# Patient Record
Sex: FEMALE
[De-identification: names, ages, dates, MRNs, and addresses within clinical notes are randomized per-mention and may not be internally consistent; named-entity substitution may affect disease eponyms.]

---

## 2019-10-09 ENCOUNTER — APPOINTMENT (OUTPATIENT)
Dept: PAIN MANAGEMENT | Facility: CLINIC | Age: 64
End: 2019-10-09
Payer: MEDICARE

## 2019-10-09 VITALS
DIASTOLIC BLOOD PRESSURE: 70 MMHG | SYSTOLIC BLOOD PRESSURE: 122 MMHG | HEIGHT: 66 IN | BODY MASS INDEX: 27.64 KG/M2 | WEIGHT: 172 LBS

## 2019-10-09 DIAGNOSIS — M79.2 NEURALGIA AND NEURITIS, UNSPECIFIED: ICD-10-CM

## 2019-10-09 DIAGNOSIS — M79.18 MYALGIA, OTHER SITE: ICD-10-CM

## 2019-10-09 DIAGNOSIS — G89.4 CHRONIC PAIN SYNDROME: ICD-10-CM

## 2019-10-09 DIAGNOSIS — M54.12 RADICULOPATHY, CERVICAL REGION: ICD-10-CM

## 2019-10-09 DIAGNOSIS — M48.02 SPINAL STENOSIS, CERVICAL REGION: ICD-10-CM

## 2019-10-09 PROBLEM — Z00.00 ENCOUNTER FOR PREVENTIVE HEALTH EXAMINATION: Status: ACTIVE | Noted: 2019-10-09

## 2019-10-09 PROCEDURE — 99204 OFFICE O/P NEW MOD 45 MIN: CPT

## 2019-10-09 RX ORDER — BIMATOPROST 0.1 MG/ML
0.01 SOLUTION/ DROPS OPHTHALMIC
Refills: 0 | Status: ACTIVE | COMMUNITY

## 2019-10-09 RX ORDER — METHYLPREDNISOLONE 4 MG/1
4 TABLET ORAL
Qty: 1 | Refills: 0 | Status: ACTIVE | COMMUNITY
Start: 2019-10-09 | End: 1900-01-01

## 2019-10-09 NOTE — ASSESSMENT
[FreeTextEntry1] : I personally reviewed the relevant imaging.  Discussed and explained to patient the likely source of pathology and pain.  Questions answered.\par \par Significant cervical spinal stenosis with possible myelomalacia on imaging\par \par Will refer back to Dr. Pollock for surgical evaluation\par \par trial medrol dose pack for symptoms\par \par careful PT - minimize motion\par \par if she is unable to have surgery and continues to have pain despite conservative treatments, may consider PAYTON - patient understands increased risks associated with spinal cord signal changes\par \par \par The above diagnosis and treatment plan is medically reasonable and necessary based on the patient encounter.\par Opiod contract signed, and patient provided copy. I-stop checked - appropriate. \par There were no barriers to communication.\par Informed patient that I would be available for any additional questions.\par Patient was instructed to call with any worsening symptoms including severe pain, new numbness/weakness, or changes in the bowel/bladder function. \par  \par Discussed role of nsaids in pain management and all relevant risks, if patient is continuing to require after 4 weeks the patient should f/u for alternative treatment. \par \par Instructed patient to maintain pain diary to monitor pain level, mobility, and function.\par \par The referring provider was informed of the above diagnosis and treatment plan.\par

## 2019-10-09 NOTE — REASON FOR VISIT
[Initial Consultation] : an initial pain management consultation [FreeTextEntry2] : referred by Dr. GOOD  for evaluation of neck pain

## 2019-10-09 NOTE — HISTORY OF PRESENT ILLNESS
[___ yrs] : [unfilled] year(s) ago [Paroxysmal] : paroxysmal [Throbbing] : throbbing [Burning] : burning [Turning Head] : turning head [Heat] : heat [3] : 2. What number best describes how, during the past week, pain has interfered with your enjoyment of life? 3/10 pain [FreeTextEntry4] : PT [FreeTextEntry7] : left shoulder pain [FreeTextEntry1] : \par \par HPI\par \par Ms. PITER GONZALES is a 64 year F with pmhx bilateral knee knee replacement and UC.  presents with neck pain radiating to left shoulder with associated numbness in left hand.  Presents with neck pain since 2015 denies any worsening weakness, bowel/bladder dysfunction\par \par \par \par Previous and current pain medications/doses/effects:\par \par gabapentin without improvement\par \par Previous Pain Treatments:\par \par PT with mild improvement\par \par Previous Pain Injections:\par \par n/a\par \par Previous Diagnostic Studies/Images:\par \par MRI CS 9/2019\par \par C3-4  Together with mild left-sided facet arthropathy and hypertrophy causes mild left-sided foraminal narrowing.\par \par C4-5 there is mild diffuse disc bulge causing mild bilateral frontal narrowing.\par \par C5-6 there is moderate endplate degenerative changes. There is mild to moderate diffuse disc bulge with underlying spurring the causes mild compression of the spinal cord and moderate bilateral foraminal narrowing, right side greater than left. There is mildly increased T2 signal with ventral cord which may represent myomalacia versus edema. There is no associated spinal cord expansion. There is mild increase from previous exam.\par \par C6-7 there is mild diffuse disc bulge which causes mild compression of the ventral aspect of the thecal sac and mild left foraminal narrowing.\par \par  [FreeTextEntry2] : 9

## 2019-10-09 NOTE — PHYSICAL EXAM
[Spine: Flexion to ___ degrees, without pain] : spine: flexion to [unfilled] degrees, without pain [Within functional limits and without pain] : within functional limits and without pain [Spurling] : positive Spurling's Test [Normal] : Normal affect [de-identified] : Constitutional: Normal, well developed, no acute distress\par Eyes: Symmetric, External structures \par Oropharynx: Lips normal, symmetric, no external lesions appreciated\par Respiratory: Non-labored breathing, no audible wheezes\par Cardiac: Pulse palpated, no tachycardia\par Vascular: No cyanosis appreciated, no edema in bilateral lower extremities\par GI: Nondistended, no jaundice appreciated\par Neurovascular: CN2-12 grossly intact, Alert and oriented\par MSK: Normal muscle bulk, 5/5 Motor strength B/L in LE\par \par

## 2019-11-22 ENCOUNTER — APPOINTMENT (OUTPATIENT)
Dept: PAIN MANAGEMENT | Facility: CLINIC | Age: 64
End: 2019-11-22

## 2025-03-08 ENCOUNTER — NON-APPOINTMENT (OUTPATIENT)
Age: 70
End: 2025-03-08